# Patient Record
Sex: FEMALE | Race: WHITE | URBAN - METROPOLITAN AREA
[De-identification: names, ages, dates, MRNs, and addresses within clinical notes are randomized per-mention and may not be internally consistent; named-entity substitution may affect disease eponyms.]

---

## 2022-03-20 ENCOUNTER — EMERGENCY (EMERGENCY)
Facility: HOSPITAL | Age: 67
LOS: 1 days | Discharge: HOME | End: 2022-03-20
Admitting: EMERGENCY MEDICINE
Payer: MEDICARE

## 2022-03-20 VITALS
OXYGEN SATURATION: 95 % | RESPIRATION RATE: 18 BRPM | HEART RATE: 61 BPM | WEIGHT: 125 LBS | TEMPERATURE: 98 F | SYSTOLIC BLOOD PRESSURE: 118 MMHG | HEIGHT: 67 IN | DIASTOLIC BLOOD PRESSURE: 60 MMHG

## 2022-03-20 VITALS
RESPIRATION RATE: 18 BRPM | OXYGEN SATURATION: 99 % | DIASTOLIC BLOOD PRESSURE: 69 MMHG | HEART RATE: 66 BPM | TEMPERATURE: 98 F | SYSTOLIC BLOOD PRESSURE: 132 MMHG

## 2022-03-20 LAB
SARS-COV-2 RNA SPEC QL NAA+PROBE: SIGNIFICANT CHANGE UP

## 2022-03-20 PROCEDURE — G1004: CPT

## 2022-03-20 PROCEDURE — 73110 X-RAY EXAM OF WRIST: CPT | Mod: 26,RT

## 2022-03-20 PROCEDURE — 73110 X-RAY EXAM OF WRIST: CPT

## 2022-03-20 PROCEDURE — 71101 X-RAY EXAM UNILAT RIBS/CHEST: CPT | Mod: 26,RT

## 2022-03-20 PROCEDURE — 70450 CT HEAD/BRAIN W/O DYE: CPT | Mod: 26,ME

## 2022-03-20 PROCEDURE — 70486 CT MAXILLOFACIAL W/O DYE: CPT | Mod: 26,MG

## 2022-03-20 PROCEDURE — 73080 X-RAY EXAM OF ELBOW: CPT | Mod: 26,RT

## 2022-03-20 PROCEDURE — 70486 CT MAXILLOFACIAL W/O DYE: CPT | Mod: MG

## 2022-03-20 PROCEDURE — 71101 X-RAY EXAM UNILAT RIBS/CHEST: CPT

## 2022-03-20 PROCEDURE — 70450 CT HEAD/BRAIN W/O DYE: CPT | Mod: ME

## 2022-03-20 PROCEDURE — 96372 THER/PROPH/DIAG INJ SC/IM: CPT | Mod: XU

## 2022-03-20 PROCEDURE — U0005: CPT

## 2022-03-20 PROCEDURE — 25605 CLTX DST RDL FX/EPHYS SEP W/: CPT | Mod: RT

## 2022-03-20 PROCEDURE — 73080 X-RAY EXAM OF ELBOW: CPT

## 2022-03-20 PROCEDURE — 99285 EMERGENCY DEPT VISIT HI MDM: CPT

## 2022-03-20 PROCEDURE — 99285 EMERGENCY DEPT VISIT HI MDM: CPT | Mod: 25

## 2022-03-20 PROCEDURE — 73110 X-RAY EXAM OF WRIST: CPT | Mod: 26,RT,77,76

## 2022-03-20 PROCEDURE — U0003: CPT

## 2022-03-20 RX ORDER — SODIUM CHLORIDE 9 MG/ML
1000 INJECTION INTRAMUSCULAR; INTRAVENOUS; SUBCUTANEOUS ONCE
Refills: 0 | Status: DISCONTINUED | OUTPATIENT
Start: 2022-03-20 | End: 2022-03-20

## 2022-03-20 RX ORDER — MORPHINE SULFATE 50 MG/1
4 CAPSULE, EXTENDED RELEASE ORAL ONCE
Refills: 0 | Status: DISCONTINUED | OUTPATIENT
Start: 2022-03-20 | End: 2022-03-20

## 2022-03-20 RX ORDER — OXYCODONE AND ACETAMINOPHEN 5; 325 MG/1; MG/1
1 TABLET ORAL ONCE
Refills: 0 | Status: DISCONTINUED | OUTPATIENT
Start: 2022-03-20 | End: 2022-03-20

## 2022-03-20 RX ORDER — MORPHINE SULFATE 50 MG/1
2 CAPSULE, EXTENDED RELEASE ORAL ONCE
Refills: 0 | Status: DISCONTINUED | OUTPATIENT
Start: 2022-03-20 | End: 2022-03-20

## 2022-03-20 RX ORDER — OXYCODONE AND ACETAMINOPHEN 5; 325 MG/1; MG/1
1 TABLET ORAL
Qty: 12 | Refills: 0
Start: 2022-03-20 | End: 2022-03-22

## 2022-03-20 RX ADMIN — OXYCODONE AND ACETAMINOPHEN 1 TABLET(S): 5; 325 TABLET ORAL at 11:10

## 2022-03-20 RX ADMIN — OXYCODONE AND ACETAMINOPHEN 1 TABLET(S): 5; 325 TABLET ORAL at 16:08

## 2022-03-20 RX ADMIN — MORPHINE SULFATE 2 MILLIGRAM(S): 50 CAPSULE, EXTENDED RELEASE ORAL at 12:24

## 2022-03-20 NOTE — ED PROVIDER NOTE - ENMT, MLM
Airway patent, Nasal mucosa clear. Mouth with normal mucosa.  abrasion to chin, no laceration. no scalp laceration

## 2022-03-20 NOTE — ED PROVIDER NOTE - PHYSICAL EXAMINATION
Left wrist + deformed distal radius , + angular , able to move fingers, no motor or sensory deficit , NVI

## 2022-03-20 NOTE — ED ADULT NURSE NOTE - NS ED PATIENT SAFETY CONCERN
INR not at goal. Medications, chart, and patient findings reviewed. See calendar for adjustments to dose and follow up plan.  Findings: Confirmed incorrect dose 10mg daily except 7.5mg Sunday (higher dosing than recommended); he also reports less greens; he plans to resume regular intake; & denies any other changes.  Will instruct pt to hold coumadin 3/11-3/12; then re-assess 3/13.      
No

## 2022-03-20 NOTE — ED ADULT NURSE NOTE - NSIMPLEMENTINTERV_GEN_ALL_ED
Implemented All Fall with Harm Risk Interventions:  [place] to call system. Call bell, personal items and telephone within reach. Instruct patient to call for assistance. Room bathroom lighting operational. Non-slip footwear when patient is off stretcher. Physically safe environment: no spills, clutter or unnecessary equipment. Stretcher in lowest position, wheels locked, appropriate side rails in place. Provide visual cue, wrist band, yellow gown, etc. Monitor gait and stability. Monitor for mental status changes and reorient to person, place, and time. Review medications for side effects contributing to fall risk. Reinforce activity limits and safety measures with patient and family. Provide visual clues: red socks.

## 2022-03-20 NOTE — ED PROVIDER NOTE - NSFOLLOWUPINSTRUCTIONS_ED_ALL_ED_FT
If experience any symptoms that resemble description below return to ED ASAP. Otherwise rest and elevate wrist.                                                                   Acute Compartment Syndrome       Acute compartment syndrome is a painful condition that occurs when swelling and pressure build up suddenly in a body space (compartment). Groups of muscles, nerves, and blood vessels in the arms and legs are  into various compartments. Each compartment is surrounded by tough layers of tissue called fascia. In compartment syndrome, pressure builds up within the layers of fascia and begins to push on the structures within that compartment. Compartment syndrome can occur in any muscle compartment of the body, but it is most common in the leg (below the knee) and in the arm (below the elbow).    In acute compartment syndrome, the sudden pressure that builds up is often because of an injury. If pressure continues to build, it can block the flow of blood in the smallest blood vessels (capillaries). As a result, the muscles in the compartment cannot get enough oxygen and nutrients and will start to die within 4–6 hours. The nerves will begin to die within 12–24 hours. This condition is a medical emergency, and it must be treated with surgery. If left untreated, this condition could result in permanent damage or loss of the leg or arm.      What are the causes?    This condition may be caused by:•Injury. Some injuries can cause swelling or bleeding in a compartment, and this can lead to compartment syndrome. Injuries that may cause this problem include:  •Broken bones, especially the long bones of your arms and legs.      •Crushing injuries.      •Badly bruised muscles.      •Injuries from something that penetratesthe body, such as a knife that causes a wound.      •Severe burns.      •Poisonous bites, such as a snake bite.      •Blocked blood flow. Causes of blocked blood flow include:  •A cast or bandage that is too tight.      •A surgical procedure. Blood flow sometimes must be stopped for a while during a surgery, usually with a very tight device (tourniquet).      •Lying for too long in a position that restricts blood flow. This can happen if you have nerve damage or if you are unconscious for a long time.      •Anabolic steroids. These medicines are used to build up muscles.      •Blood thinners. These medicines keep your blood from forming clots but can increase internal bleeding after an injury.          What are the signs or symptoms?    The main symptom of this condition is pain.  •Pain may be far more severe than it should be for the injury you have.    •Pain may get worse:  •When moving or stretching the affected leg or arm.      •When the area is pushed or squeezed.      •When you raise (elevate) the affected area above the level of your heart.        •Pain may come with a feeling of tingling or burning.      •Pain may not get better when you take pain medicine.      Other symptoms include:  •A feeling of tightness or fullness in the affected area.      •Numbness.      •Weakness in the affected area.      •Loss of movement.      •Skin becoming pale, tight, and shiny over the painful area.      •Warmth and tenderness.      •Tensing when the affected area is touched.        How is this diagnosed?    This condition may be diagnosed based on:  •Your history and symptoms.      •A physical exam.      •Measuring the pressure in the affected area (compartment pressure measurement).      You may also have other tests, including:  •X-rays.      •Ultrasound.        How is this treated?    This condition is treated with a procedure called fasciotomy. This is an urgent procedure that is done as soon as possible to prevent muscle death. In this procedure, incisions are made through the fascia to relieve the pressure in the compartment and to restore blood flow to that area. This procedure helps to prevent permanent damage to the body part. Before the surgery, first-aid treatment is done, which may include:  •Treating any injury.      •Loosening or removing any splint, cast, bandage, or external wrap that may be causing pain.      •Elevating the painful arm or leg to the same level as your heart.      •Giving oxygen.      •Giving fluids through an IV.      •Giving pain medicine.        Summary    •Compartment syndrome occurs when swelling and pressure build up in a body space (compartment). Compartment syndrome can occur in any muscle compartment of the body but is most common in the leg (below the knee) and in the arm (below the elbow).      •In acute compartment syndrome, the pressure builds up suddenly, often because of an injury. This condition is a medical emergency.      •Symptoms of acute compartment syndrome may include one or more of the following in the affected body part: severe pain, tingling or burning, weakness, loss of movement, or paleness.      •First-aid treatment may include loosening or removing a splint, cast, bandage, or wrap. It may also include elevating your painful arm or leg to the same level as your heart.      •Compartment syndrome must be treated with a surgical procedure called fasciotomy. This procedure relieves pressure, restores blood flow, and prevents permanent damage.      This information is not intended to replace advice given to you by your health care provider. Make sure you discuss any questions you have with your health care provider.      Document Revised: 06/22/2021 Document Reviewed: 06/22/2021    Elsevier Patient Education © 2022 [employer]

## 2022-03-20 NOTE — ED ADULT TRIAGE NOTE - OTHER COMPLAINTS
Patient noted to have abrasion to chin. Denies use of blood thinner or loss of consciousness. Patient also c/o pain to left knee and right wrist.

## 2022-03-20 NOTE — ED PROVIDER NOTE - PATIENT PORTAL LINK FT
You can access the FollowMyHealth Patient Portal offered by [facility] by registering at the following website: [email or web address]. By joining [facility]’s FollowMyHealth portal, you will also be able to view your health information using other applications (apps) compatible with our system.
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
## 2022-03-20 NOTE — ED PROVIDER NOTE - CARE PROVIDER_API CALL
Anson Beck)  Orthopaedic Surgery  [address] [place] [address], [address]  [place] [address]  Phone: [phone]  Fax: [phone]  Follow Up Time:
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
## 2022-03-23 DIAGNOSIS — M81.0 AGE-RELATED OSTEOPOROSIS WITHOUT CURRENT PATHOLOGICAL FRACTURE: ICD-10-CM

## 2022-03-23 DIAGNOSIS — M25.531 PAIN IN RIGHT WRIST: ICD-10-CM
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
## 2022-03-23 DIAGNOSIS — M32.9 SYSTEMIC LUPUS ERYTHEMATOSUS, UNSPECIFIED: ICD-10-CM

## 2022-03-23 DIAGNOSIS — Z20.822 CONTACT WITH AND (SUSPECTED) EXPOSURE TO COVID-19: ICD-10-CM

## 2022-03-23 DIAGNOSIS — S52.501A UNSPECIFIED FRACTURE OF THE LOWER END OF RIGHT RADIUS, INITIAL ENCOUNTER FOR CLOSED FRACTURE: ICD-10-CM
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
## 2022-03-23 DIAGNOSIS — Z88.1 ALLERGY STATUS TO OTHER ANTIBIOTIC AGENTS STATUS: ICD-10-CM

## 2024-06-04 NOTE — ED PROVIDER NOTE - CPE EDP EYES NORM
normal... Treatment Goal Explanation (Does Not Render In The Note): Stable for the purposes of categorizing medical decision making is defined by the specific treatment goals for an individual patient. A patient that is not at their treatment goal is not stable, even if the condition has not changed and there is no short- term threat to life or function.

## 2025-01-29 ENCOUNTER — APPOINTMENT (OUTPATIENT)
Dept: OPHTHALMOLOGY | Facility: CLINIC | Age: 70
End: 2025-01-29
Payer: MEDICARE

## 2025-01-29 ENCOUNTER — NON-APPOINTMENT (OUTPATIENT)
Age: 70
End: 2025-01-29

## 2025-01-29 PROCEDURE — 92002 INTRM OPH EXAM NEW PATIENT: CPT

## 2025-01-29 PROCEDURE — 92133 CPTRZD OPH DX IMG PST SGM ON: CPT | Mod: NC

## 2025-01-29 PROCEDURE — 92250 FUNDUS PHOTOGRAPHY W/I&R: CPT | Mod: XU

## 2025-01-29 PROCEDURE — 92136 OPHTHALMIC BIOMETRY: CPT

## 2025-02-04 PROBLEM — Z00.00 ENCOUNTER FOR PREVENTIVE HEALTH EXAMINATION: Status: ACTIVE | Noted: 2025-02-04

## 2025-07-09 ENCOUNTER — APPOINTMENT (OUTPATIENT)
Dept: OPHTHALMOLOGY | Facility: CLINIC | Age: 70
End: 2025-07-09
Payer: MEDICARE

## 2025-07-09 ENCOUNTER — NON-APPOINTMENT (OUTPATIENT)
Age: 70
End: 2025-07-09

## 2025-07-09 PROCEDURE — 92012 INTRM OPH EXAM EST PATIENT: CPT
